# Patient Record
Sex: MALE | Race: WHITE | Employment: OTHER | URBAN - METROPOLITAN AREA
[De-identification: names, ages, dates, MRNs, and addresses within clinical notes are randomized per-mention and may not be internally consistent; named-entity substitution may affect disease eponyms.]

---

## 2023-08-28 ENCOUNTER — OFFICE VISIT (OUTPATIENT)
Dept: ORTHOPEDIC SURGERY | Age: 73
End: 2023-08-28
Payer: MEDICARE

## 2023-08-28 DIAGNOSIS — S90.31XA CONTUSION OF RIGHT FOOT, INITIAL ENCOUNTER: Primary | ICD-10-CM

## 2023-08-28 DIAGNOSIS — M79.672 BILATERAL FOOT PAIN: ICD-10-CM

## 2023-08-28 DIAGNOSIS — M79.671 BILATERAL FOOT PAIN: ICD-10-CM

## 2023-08-28 PROCEDURE — G8428 CUR MEDS NOT DOCUMENT: HCPCS | Performed by: STUDENT IN AN ORGANIZED HEALTH CARE EDUCATION/TRAINING PROGRAM

## 2023-08-28 PROCEDURE — 1123F ACP DISCUSS/DSCN MKR DOCD: CPT | Performed by: STUDENT IN AN ORGANIZED HEALTH CARE EDUCATION/TRAINING PROGRAM

## 2023-08-28 PROCEDURE — 3017F COLORECTAL CA SCREEN DOC REV: CPT | Performed by: STUDENT IN AN ORGANIZED HEALTH CARE EDUCATION/TRAINING PROGRAM

## 2023-08-28 PROCEDURE — 99203 OFFICE O/P NEW LOW 30 MIN: CPT | Performed by: STUDENT IN AN ORGANIZED HEALTH CARE EDUCATION/TRAINING PROGRAM

## 2023-08-28 PROCEDURE — 4004F PT TOBACCO SCREEN RCVD TLK: CPT | Performed by: STUDENT IN AN ORGANIZED HEALTH CARE EDUCATION/TRAINING PROGRAM

## 2023-08-28 PROCEDURE — G8421 BMI NOT CALCULATED: HCPCS | Performed by: STUDENT IN AN ORGANIZED HEALTH CARE EDUCATION/TRAINING PROGRAM

## 2023-08-28 NOTE — PROGRESS NOTES
Name: Mynor Corbett  YOB: 1950  Gender: male  MRN: [id number]  Date of Encounter:  8/28/2023       CHIEF COMPLAINT:     Chief Complaint   Patient presents with    Foot Pain     Bilateral        SUBJECTIVE/OBJECTIVE:      HPI:    Mynor Corbett  is a 81 y.o. pleasant male who presents today for a new evaluation of his Right foot pain. 8/21/23 he tripped going down steps in socks and slipped 3 stairs, hitting his right foot. He has had bruising and swelling since that time, to the lateral and dorsal foot. His wife noted deformity to his fifth metatarsal. His brother is an ER physician who was concerned about a Smith fx. He is the uncle of Delonte Chirs. His foot pain has improved with use of ice and he walks without much pain in a supportive sneaker. PAST HISTORY:   Past medical, surgical, family, social history and allergies reviewed by me. Pertinent history: DM, neuropathy  Tobacco use:  has no history on file for tobacco use. Diabetes: diabetic-non insulin  Anticoagulation: no      REVIEW OF SYSTEMS:   As noted in HPI. PHYSICAL EXAMINATION:     Gen: Well-developed, no acute distress   HEENT: NC/AT, EOMI   Neck: Trachea midline, normal ROM   CV: Regular rhythm by palpation of distal pulse, normal capillary refill   Pulm: No respiratory distress, no stridor   Psychiatric: Well oriented, normal mood and affect. Skin: No rashes, lesions or ulcers, normal temperature, turgor, and texture on uninvolved extremity. ORTHO EXAM:    Right Foot    Inspection: edema and ecchymosis of dorsal distal foot, with increased ecchymosis of third phalanx. bony prominence of base of fifth metatarsal asymmetric to right. There is no displacement of phalanges of foot. Tenderness: mild tenderness to dorsum of foot between fourth and fifth metatarsals, cuboid. Mild tenderness of third phalanx. Fifth metatarsal is not tender.    Provocative tests: resisted eversion does not induce pain, no

## 2024-04-25 ENCOUNTER — OFFICE VISIT (OUTPATIENT)
Dept: ORTHOPEDIC SURGERY | Age: 74
End: 2024-04-25

## 2024-04-25 DIAGNOSIS — M79.10 MYALGIA, UNSPECIFIED SITE: ICD-10-CM

## 2024-04-25 DIAGNOSIS — M54.50 LUMBAR BACK PAIN: ICD-10-CM

## 2024-04-25 DIAGNOSIS — M47.816 SPONDYLOSIS OF LUMBAR REGION WITHOUT MYELOPATHY OR RADICULOPATHY: Primary | ICD-10-CM

## 2024-04-25 RX ORDER — TRIAMCINOLONE ACETONIDE 40 MG/ML
120 INJECTION, SUSPENSION INTRA-ARTICULAR; INTRAMUSCULAR ONCE
Status: COMPLETED | OUTPATIENT
Start: 2024-04-25 | End: 2024-04-25

## 2024-04-25 RX ADMIN — TRIAMCINOLONE ACETONIDE 120 MG: 40 INJECTION, SUSPENSION INTRA-ARTICULAR; INTRAMUSCULAR at 10:34

## 2024-04-25 NOTE — PROGRESS NOTES
Date:  04/25/24     HPI:  I am seeing Scott Mclaughlin in evalution/folowup.  He has had problems for about 6 months or so, no accident or injury.  He does carry the diagnosis of diabetic peripheral neuropathy.  At that time he developed pain in the left lower lumbar paraspinal area but at the gravitate down the lateral aspect the left leg.  That has gotten better and now is complaining more pain in the right lower lumbar paraspinal area that may gravitate somewhat to the right buttock area.  It is sharp his pain is worse with sitting and standing.  Reclining or lying on his soft couch may help somewhat.  The pain does not gravitate down the left at this time, as it has gotten better.  Currently there is no pain gravitating down the right.  He offers no neurologic issues lower extremities other than those that he attributes to his peripheral neuropathy.  His current pain scale is up to 7/10.  He underwent physical therapy about 6 months ago with questionable benefit.  He has been performing lumbar spine exercises afterwards which she feels offer some benefit.  He has been swimming several days a week and also his using a recumbent bicycle and that is all helping.  He has tried ibuprofen and naproxen with some benefit.  He may have been on gabapentin in the past for his neuropathy.  Possibly had muscle relaxers but denies being on oral steroids.  He has never had spine surgical intervention.  No lumbar spine injections.  Of note he may have had some cervical spine injections about 20 years ago.    MR imaging of the lumbar spine from 6 months ago reveals mild degenerative changes, no neural impingement.  Mild facet arthropathy.    PMH: AODM, renal dysfunction, lipid disorder, hypertension, nephrolithiasis, lumbar spine pain as above, ZAHRAA now on CPAP    PSH: No spine surgical intervention    FH: Noncontributory to this examination and visit.    Social History     Socioeconomic History    Marital status: Unknown

## 2025-03-26 ENCOUNTER — OFFICE VISIT (OUTPATIENT)
Dept: ORTHOPEDIC SURGERY | Age: 75
End: 2025-03-26
Payer: MEDICARE

## 2025-03-26 VITALS — BODY MASS INDEX: 33.47 KG/M2 | HEIGHT: 69 IN | WEIGHT: 226 LBS

## 2025-03-26 DIAGNOSIS — M67.90 TENDINOPATHY OF UPPER EXTREMITY: ICD-10-CM

## 2025-03-26 DIAGNOSIS — M79.601 RIGHT ARM PAIN: Primary | ICD-10-CM

## 2025-03-26 PROCEDURE — 3017F COLORECTAL CA SCREEN DOC REV: CPT | Performed by: PHYSICIAN ASSISTANT

## 2025-03-26 PROCEDURE — G8419 CALC BMI OUT NRM PARAM NOF/U: HCPCS | Performed by: PHYSICIAN ASSISTANT

## 2025-03-26 PROCEDURE — 1123F ACP DISCUSS/DSCN MKR DOCD: CPT | Performed by: PHYSICIAN ASSISTANT

## 2025-03-26 PROCEDURE — 99203 OFFICE O/P NEW LOW 30 MIN: CPT | Performed by: PHYSICIAN ASSISTANT

## 2025-03-26 PROCEDURE — 4004F PT TOBACCO SCREEN RCVD TLK: CPT | Performed by: PHYSICIAN ASSISTANT

## 2025-03-26 PROCEDURE — G8428 CUR MEDS NOT DOCUMENT: HCPCS | Performed by: PHYSICIAN ASSISTANT

## 2025-03-26 RX ORDER — GLIMEPIRIDE 4 MG/1
4 TABLET ORAL 2 TIMES DAILY
COMMUNITY
Start: 2024-11-25

## 2025-03-26 RX ORDER — DICLOFENAC EPOLAMINE 0.01 G/1
1 SYSTEM TOPICAL 2 TIMES DAILY
Qty: 60 PATCH | Refills: 0 | Status: SHIPPED | OUTPATIENT
Start: 2025-03-26

## 2025-03-26 RX ORDER — MULTIVITAMIN,THERAPEUTIC
1 TABLET ORAL
COMMUNITY

## 2025-03-26 RX ORDER — INSULIN GLARGINE 100 [IU]/ML
28 INJECTION, SOLUTION SUBCUTANEOUS EVERY MORNING
COMMUNITY
Start: 2024-11-25

## 2025-03-26 RX ORDER — LANCETS 33 GAUGE
EACH MISCELLANEOUS
COMMUNITY
Start: 2025-01-08

## 2025-03-26 RX ORDER — TRAZODONE HYDROCHLORIDE 50 MG/1
75 TABLET ORAL
COMMUNITY
Start: 2025-01-17

## 2025-03-26 RX ORDER — LISINOPRIL 20 MG/1
20 TABLET ORAL DAILY
COMMUNITY
Start: 2024-11-25

## 2025-03-26 RX ORDER — ATORVASTATIN CALCIUM 10 MG/1
10 TABLET, FILM COATED ORAL EVERY OTHER DAY
COMMUNITY
Start: 2024-11-25

## 2025-03-26 NOTE — PROGRESS NOTES
[facility]          Patient ID:  Name: Scott Mclaughlin  AGE/Gender: 74 y.o. male  MRN: [id number]  : 1950    Date of Consultation:  2025          ALLERGIES: No Known Allergies     CC: Right arm pain     History:  The patient 74 y.o. right hand dominant male, well known to me, who presents today as a new patient complaining of right arm pain.   This started insidiously a couple months ago.  He has been in physical therapy for 6 weeks without any improvement at all.  He localizes pain to the mid aspect of the right upper arm.  He has discomfort with activity.  He denies any injuries. He has a history of HTN, ZAHRAA, peripheral neuropathy,  type 2 diabetes which is controlled with Mounjaro, Amaryl, metformin and Basaglar.  He denies any shoulder pain.  Denies any elbow pain.  No numbness or tingling. The arm is point tender.    Review of Systems:  Pertinent items are noted in HPI.    Past Medical History Includes: No past medical history on file., No past surgical history on file.    Family History: No family history on file.     Social History:   Social History     Tobacco Use    Smoking status: Former     Types: Cigarettes     Start date:      Quit date:      Years since quittin.2    Smokeless tobacco: Never   Substance Use Topics    Alcohol use: Not on file         Physical Exam:     General:  On exam the patient is a 74 y.o. male in no acute distress, A&O x 3.  HEENT: NC/AT, PERRL   Psych: normal mood, normal affect  Lungs: respirations even, normal breath sounds  Skin:  No redness, rashes or wounds of the upper extremities  MSK (Shoulder):   Right shoulder:    0-170 degrees of active forward flexion   0-170 degrees passive forward elevation.   Internal rotation is to L5.  External rotation is to 80 degrees at the side.   The AC joint is non-tender  SC joint is non-tender.   Greater tuberosity is non-tender.  No tenderness at the proximal biceps tendon   No posterior